# Patient Record
Sex: FEMALE | Race: WHITE | NOT HISPANIC OR LATINO | Employment: FULL TIME | URBAN - METROPOLITAN AREA
[De-identification: names, ages, dates, MRNs, and addresses within clinical notes are randomized per-mention and may not be internally consistent; named-entity substitution may affect disease eponyms.]

---

## 2023-12-06 ENCOUNTER — OFFICE VISIT (OUTPATIENT)
Dept: OBSTETRICS AND GYNECOLOGY | Facility: CLINIC | Age: 38
End: 2023-12-06
Payer: COMMERCIAL

## 2023-12-06 VITALS
SYSTOLIC BLOOD PRESSURE: 110 MMHG | WEIGHT: 128.4 LBS | HEIGHT: 67 IN | DIASTOLIC BLOOD PRESSURE: 70 MMHG | BODY MASS INDEX: 20.15 KG/M2

## 2023-12-06 DIAGNOSIS — Z87.42 HISTORY OF ABNORMAL CERVICAL PAP SMEAR: ICD-10-CM

## 2023-12-06 DIAGNOSIS — Z01.419 WOMEN'S ANNUAL ROUTINE GYNECOLOGICAL EXAMINATION: Primary | ICD-10-CM

## 2023-12-06 DIAGNOSIS — Z30.011 ENCOUNTER FOR INITIAL PRESCRIPTION OF CONTRACEPTIVE PILLS: ICD-10-CM

## 2023-12-06 PROCEDURE — 99396 PREV VISIT EST AGE 40-64: CPT | Performed by: OBSTETRICS & GYNECOLOGY

## 2023-12-06 RX ORDER — NORGESTIMATE AND ETHINYL ESTRADIOL 7DAYSX3 28
1 KIT ORAL DAILY
Qty: 84 TABLET | Refills: 3 | Status: SHIPPED | OUTPATIENT
Start: 2023-12-06

## 2023-12-06 NOTE — PROGRESS NOTES
Gynecologic Annual Exam Note          GYN Annual Exam     Annual Exam        Subjective     HPI  Olena Larios is a 38 y.o. female, , who presents for annual well woman exam as a established patient.   Her periods are regular x 4 days of moderate flow.  She denies dysmenorrhea.  Partner Status: Marital Status: . She is is sexually active. She has not had new partners.  STD testing recommendations have been explained to the patient and she does not desire STD testing. There were no changes to her medical or surgical history since her last visit.     She is nursing at night.  She was prescribed TERRI at her PP visit but she did not start because she was concerned it would decrease milk supply. She would like to start OCP now.       Additional OB/GYN History   Current contraception: contraceptive methods: natural family planning   Desires to: desires oral contraception    Last Pap : . Result: negative. HPV: negative.   Last Completed Pap Smear            PAP SMEAR (Every 3 Years) Next due on 2025  LIQUID-BASED PAP SMEAR, [facility] ([facility])    2021  SCANNED - PAP SMEAR    2020  Done - hpv regardless; negative                  History of abnormal Pap smear: yes - mild dysplasia in 2018  Family history of uterine, colon, breast, or ovarian cancer: yes - pt's mother had breast cancer  dx @ age 56  Performs monthly Self-Breast Exam: yes  Last mammogram: 2013      Last Completed Mammogram       This patient has no relevant Health Maintenance data.              Colonoscopy: has never had a colonoscopy.  Exercises Regularly: no  Feelings of Anxiety or Depression: no  Tobacco Usage?: No       Current Outpatient Medications:     norgestimate-ethinyl estradiol (Ortho Tri-Cyclen, 28,) 0.18/0.215/0.25 MG-35 MCG per tablet, Take 1 tablet by mouth Daily., Disp: 84 tablet, Rfl: 3    Prenatal Vit-Fe Fumarate-FA (PRENATAL PO), Take  by mouth., Disp: , Rfl:           OB History          1    Para   1    Term   1       0    AB   0    Living   1         SAB   0    IAB   0    Ectopic   0    Molar   0    Multiple   0    Live Births   1                Past Medical History:   Diagnosis Date    Abnormal Pap smear of cervix 2018    Mild dysplasia    Anemia 2006    Borderline    Cervical dysplasia 2018    Mild    Fibroadenoma     Fibrocystic breast     Herpes genitalia     History of abnormal cervical Pap smear 2018    HPV +    HPV (human papilloma virus) infection 2018    Colposcopy    Ovarian cyst ?    Laparoscopy    Varicella         Past Surgical History:   Procedure Laterality Date    BREAST BIOPSY  2006    Multiple--all benign    BREAST FIBROADENOMA SURGERY Left     COLPOSCOPY  2018    OVARIAN CYST REMOVAL      Patient unable to recall    OVARIAN CYST SURGERY      Laparoscopy    SKIN BIOPSY      Left leg, sternum, back       Health Maintenance   Topic Date Due    ANNUAL PHYSICAL  Never done    Annual Gynecologic Pelvic and Breast Exam  2022    INFLUENZA VACCINE  2023    COVID-19 Vaccine (2023- season) 2023    PAP SMEAR  2025    TDAP/TD VACCINES (3 - Td or Tdap) 2032    HEPATITIS C SCREENING  Completed    Pneumococcal Vaccine 0-64  Aged Out       The additional following portions of the patient's history were reviewed and updated as appropriate: allergies, current medications, past family history, past medical history, past social history, past surgical history, and problem list.    Review of Systems   Constitutional: Negative.    HENT: Negative.     Eyes: Negative.    Respiratory: Negative.     Cardiovascular: Negative.    Gastrointestinal: Negative.    Endocrine: Negative.    Genitourinary: Negative.    Musculoskeletal: Negative.    Skin: Negative.    Allergic/Immunologic: Negative.    Neurological: Negative.    Hematological: Negative.    Psychiatric/Behavioral: Negative.           I have reviewed and  "agree with the HPI, ROS, and historical information as entered above. Adriana Muñiz MD          Objective   /70   Ht 170.2 cm (67\")   Wt 58.2 kg (128 lb 6.4 oz)   LMP 11/22/2023 (Approximate)   Breastfeeding Yes   BMI 20.11 kg/m²     Physical Exam  Vitals and nursing note reviewed. Exam conducted with a chaperone present.   Constitutional:       Appearance: She is well-developed.   HENT:      Head: Normocephalic and atraumatic.   Eyes:      Pupils: Pupils are equal, round, and reactive to light.   Neck:      Thyroid: No thyroid mass or thyromegaly.   Pulmonary:      Effort: Pulmonary effort is normal. No retractions.   Chest:      Chest wall: No mass.   Breasts:     Right: Normal. No mass, nipple discharge, skin change or tenderness.      Left: Normal. No mass, nipple discharge, skin change or tenderness.   Abdominal:      General: Bowel sounds are normal.      Palpations: Abdomen is soft. Abdomen is not rigid. There is no mass.      Tenderness: There is no abdominal tenderness. There is no guarding.      Hernia: No hernia is present. There is no hernia in the left inguinal area or right inguinal area.   Genitourinary:     Exam position: Lithotomy position.      Pubic Area: No rash.       Labia:         Right: No rash, tenderness or lesion.         Left: No rash, tenderness or lesion.       Urethra: No urethral pain or urethral swelling.      Vagina: Normal. No vaginal discharge or lesions.      Cervix: No cervical motion tenderness, discharge, lesion or cervical bleeding.      Uterus: Normal. Not enlarged, not fixed and not tender.       Adnexa:         Right: No mass, tenderness or fullness.          Left: No mass, tenderness or fullness.        Rectum: No external hemorrhoid.   Musculoskeletal:      Cervical back: Normal range of motion. No muscular tenderness.      Right lower leg: No edema.      Left lower leg: No edema.   Skin:     General: Skin is warm and dry.   Neurological:      Mental Status: " She is alert and oriented to person, place, and time.      Motor: Motor function is intact.   Psychiatric:         Mood and Affect: Mood and affect normal.         Behavior: Behavior normal.            Assessment and Plan    Problem List Items Addressed This Visit    None  Visit Diagnoses       Women's annual routine gynecological examination    -  Primary    Relevant Orders    Mammo Screening Digital Tomosynthesis Bilateral With CAD    Encounter for initial prescription of contraceptive pills                GYN annual well woman exam.   Pap guidelines reviewed.  OCP's/Vaginal Ring - Discussed side effects of nausea, BTB, headaches, breast tenderness and slight weight gain in the first three cycles.  Understands risks of blood clots, stroke, and theoretical risk of breast cancer.  Denies family history of blood clots.  Reviewed monthly self breast exams.  Instructed to call with lumps, pain, or breast discharge.    Ordered Mammogram today  Reviewed exercise as a preventative health measures.   Reccommended Flu Vaccine in Fall of each year.  RTC in 1 year or PRN with problems.  Return in about 1 year (around 12/6/2024) for Annual physical.     Adriana Muñiz MD  12/06/2023

## 2023-12-08 LAB — REF LAB TEST METHOD: NORMAL

## 2024-02-13 ENCOUNTER — DOCUMENTATION (OUTPATIENT)
Dept: OBSTETRICS AND GYNECOLOGY | Facility: CLINIC | Age: 39
End: 2024-02-13
Payer: COMMERCIAL

## 2024-03-13 ENCOUNTER — OFFICE VISIT (OUTPATIENT)
Dept: OBSTETRICS AND GYNECOLOGY | Facility: CLINIC | Age: 39
End: 2024-03-13
Payer: COMMERCIAL

## 2024-03-13 VITALS
DIASTOLIC BLOOD PRESSURE: 80 MMHG | WEIGHT: 128 LBS | HEIGHT: 67 IN | SYSTOLIC BLOOD PRESSURE: 110 MMHG | BODY MASS INDEX: 20.09 KG/M2

## 2024-03-13 DIAGNOSIS — O20.0 THREATENED ABORTION: Primary | ICD-10-CM

## 2024-03-13 RX ORDER — SACCHAROMYCES BOULARDII 250 MG
250 CAPSULE ORAL 2 TIMES DAILY
COMMUNITY

## 2024-03-13 RX ORDER — MULTIVIT WITH MINERALS/LUTEIN
250 TABLET ORAL DAILY
COMMUNITY

## 2024-03-13 NOTE — PROGRESS NOTES
"    Chief Complaint   Patient presents with    Threatened Miscarriage          HPI  Olena Larios is a 38 y.o. female, , who presents with U/S without fetal heart tones and U/S with gestational sac only.    Recent Tests:  She had a urine pregnancy test that was done 6 weeks ago that was positive..    US today: Yes.  Findings showed gestational sac measuring 6w1d. No ys or fp, +GEMA  I have personally evaluated the U/S and agree with the findings. Adriana Muñiz MD  She has not had prenatal care.  She denies associated abdominal or pelvic pain.. Her past medical history is non-contributory.  She does not have passage of tissue.  Rh Status: Positive  She reports no additional symptoms or complaints.  Patient states she has had some brown spotting Monday and today but denies pelvic pain or bright red bleeding  The additional following portions of the patient's history were reviewed and updated as appropriate: allergies and current medications.    Review of Systems  All other systems reviewed and are negative.     I have reviewed and agree with the HPI, ROS, and historical information as entered above. Adriana Muñiz MD      Objective   /80   Ht 170.2 cm (67\")   Wt 58.1 kg (128 lb)   BMI 20.05 kg/m²     Physical Exam  Vitals and nursing note reviewed.   Constitutional:       Appearance: Normal appearance.   HENT:      Head: Normocephalic and atraumatic.   Eyes:      General: No scleral icterus.     Pupils: Pupils are equal, round, and reactive to light.   Pulmonary:      Effort: Pulmonary effort is normal.      Breath sounds: Normal breath sounds.   Abdominal:      General: Bowel sounds are normal. There is no distension.      Palpations: Abdomen is soft.      Tenderness: There is no abdominal tenderness.   Musculoskeletal:         General: Normal range of motion.      Cervical back: Normal range of motion and neck supple.      Right lower leg: No edema.      Left lower leg: No edema.   Skin:   "   General: Skin is warm and dry.   Neurological:      General: No focal deficit present.      Mental Status: She is alert and oriented to person, place, and time.   Psychiatric:         Mood and Affect: Mood normal.         Behavior: Behavior normal. Behavior is cooperative.            Assessment and Plan    Problem List Items Addressed This Visit    None  Visit Diagnoses       Threatened     -  Primary    Relevant Orders    US Ob Transvaginal            Threatened AB GS measurement inconsistent with certain LMP. Concern for mab.   Pelvic Rest.  No douching, intercourse or use of tampons.  Call for an increase in bleeding, abdominal pain, or fever.  Return in about 1 week (around 3/20/2024) for Recheck, U/S Next Visit.    I spent 20 minutes caring for Olena on this date of service. This time includes time spent by me in the following activities:preparing for the visit, reviewing tests, obtaining and/or reviewing a separately obtained history, counseling and educating the patient/family/caregiver, and documenting information in the medical record        Adriana Muñiz MD  2024

## 2024-03-20 ENCOUNTER — OFFICE VISIT (OUTPATIENT)
Dept: OBSTETRICS AND GYNECOLOGY | Facility: CLINIC | Age: 39
End: 2024-03-20
Payer: COMMERCIAL

## 2024-03-20 VITALS
BODY MASS INDEX: 20.09 KG/M2 | DIASTOLIC BLOOD PRESSURE: 80 MMHG | WEIGHT: 128 LBS | HEIGHT: 67 IN | SYSTOLIC BLOOD PRESSURE: 122 MMHG

## 2024-03-20 DIAGNOSIS — O03.9 MISCARRIAGE: Primary | ICD-10-CM

## 2024-03-20 NOTE — PROGRESS NOTES
"    Chief Complaint   Patient presents with    Miscarriage          HPI  Olena Larios is a 38 y.o. female, , who presents with U/S without fetal heart tones.  She reports passing GS on saturday    Recent Tests:  She had a urine pregnancy test that was done 7 weeks ago that was positive..    US today: Yes.  Findings showed no evidence IUP.  I have personally evaluated the U/S and agree with the findings. Adriana Muñiz MD  She has not had prenatal care.  She denies associated abdominal or pelvic pain.. Her past medical history is non-contributory.  She has passage of tissue.  Rh Status: Positive  She reports no additional symptoms or complaints.    The additional following portions of the patient's history were reviewed and updated as appropriate: allergies and current medications.    Review of Systems  All other systems reviewed and are negative.     I have reviewed and agree with the HPI, ROS, and historical information as entered above. Adriana Muñiz MD      Objective   /80   Ht 170.2 cm (67\")   Wt 58.1 kg (128 lb)   Breastfeeding No   BMI 20.05 kg/m²     Physical Exam  Vitals and nursing note reviewed.   Constitutional:       Appearance: Normal appearance.   HENT:      Head: Normocephalic and atraumatic.   Eyes:      General: No scleral icterus.     Pupils: Pupils are equal, round, and reactive to light.   Pulmonary:      Effort: Pulmonary effort is normal.      Breath sounds: Normal breath sounds.   Abdominal:      General: Bowel sounds are normal. There is no distension.      Palpations: Abdomen is soft.      Tenderness: There is no abdominal tenderness.   Musculoskeletal:         General: Normal range of motion.      Cervical back: Normal range of motion and neck supple.      Right lower leg: No edema.      Left lower leg: No edema.   Skin:     General: Skin is warm and dry.   Neurological:      General: No focal deficit present.      Mental Status: She is alert and oriented to " person, place, and time.   Psychiatric:         Mood and Affect: Mood normal.         Behavior: Behavior normal. Behavior is cooperative.          Assessment and Plan    Problem List Items Addressed This Visit    None  Visit Diagnoses       Miscarriage    -  Primary    completed            Now completed Sab  She has rx for ocps at home. They dont think they will TTC again at this point.   No follow-ups on file.    I spent 30 minutes caring for Olena on this date of service. This time includes time spent by me in the following activities:preparing for the visit, reviewing tests, obtaining and/or reviewing a separately obtained history, ordering medications, tests, or procedures, and documenting information in the medical record        Adriana Muñiz MD  03/20/2024

## 2024-03-22 ENCOUNTER — TELEPHONE (OUTPATIENT)
Dept: OBSTETRICS AND GYNECOLOGY | Facility: CLINIC | Age: 39
End: 2024-03-22
Payer: COMMERCIAL

## 2024-03-22 NOTE — TELEPHONE ENCOUNTER
Patient still breastfeeding at night but is due for mammogram. She has a family history of breast cancer and does not want to postpone.     Per Antonino: Pt should pump immediately before mammogram. No need to pump and dump after.     Pt notified and VU.

## 2024-03-26 ENCOUNTER — APPOINTMENT (OUTPATIENT)
Dept: OTHER | Facility: HOSPITAL | Age: 39
End: 2024-03-26
Payer: COMMERCIAL

## 2024-03-26 ENCOUNTER — HOSPITAL ENCOUNTER (OUTPATIENT)
Dept: MAMMOGRAPHY | Facility: HOSPITAL | Age: 39
Discharge: HOME | End: 2024-03-26
Payer: COMMERCIAL

## 2024-03-26 DIAGNOSIS — Z01.419 WOMEN'S ANNUAL ROUTINE GYNECOLOGICAL EXAMINATION: ICD-10-CM

## 2024-03-26 PROCEDURE — 77067 SCR MAMMO BI INCL CAD: CPT

## 2024-03-26 PROCEDURE — 77063 BREAST TOMOSYNTHESIS BI: CPT

## 2024-09-23 ENCOUNTER — HOSPITAL ENCOUNTER (OUTPATIENT)
Dept: MRI IMAGING | Facility: HOSPITAL | Age: 39
Discharge: HOME | End: 2024-09-23
Admitting: INTERNAL MEDICINE
Payer: COMMERCIAL

## 2024-09-23 DIAGNOSIS — Z91.89 AT HIGH RISK FOR BREAST CANCER: ICD-10-CM

## 2024-09-23 PROCEDURE — A9577 INJ MULTIHANCE: HCPCS | Performed by: INTERNAL MEDICINE

## 2024-09-23 PROCEDURE — 0 GADOBENATE DIMEGLUMINE 529 MG/ML SOLUTION: Performed by: INTERNAL MEDICINE

## 2024-09-23 PROCEDURE — 77049 MRI BREAST C-+ W/CAD BI: CPT

## 2024-09-23 RX ADMIN — GADOBENATE DIMEGLUMINE 12 ML: 529 INJECTION, SOLUTION INTRAVENOUS at 09:07

## 2024-09-30 ENCOUNTER — TELEPHONE (OUTPATIENT)
Dept: MRI IMAGING | Facility: HOSPITAL | Age: 39
End: 2024-09-30
Payer: COMMERCIAL

## 2024-09-30 NOTE — TELEPHONE ENCOUNTER
Patient was recommended from her MRI Breast for a Right 2nd look ultrasound. Scheduled for 10/7/2024 at 9:00 with 8:45 arrival at [address]. No BT. Encouraged to call with any further questions.   
mild

## 2024-10-07 ENCOUNTER — HOSPITAL ENCOUNTER (OUTPATIENT)
Dept: ULTRASOUND IMAGING | Facility: HOSPITAL | Age: 39
Discharge: HOME | End: 2024-10-07
Payer: COMMERCIAL

## 2024-10-07 ENCOUNTER — HOSPITAL ENCOUNTER (OUTPATIENT)
Dept: MAMMOGRAPHY | Facility: HOSPITAL | Age: 39
Discharge: HOME | End: 2024-10-07
Payer: COMMERCIAL

## 2024-10-07 DIAGNOSIS — Z91.89 AT HIGH RISK FOR BREAST CANCER: ICD-10-CM

## 2024-10-07 DIAGNOSIS — R92.8 ABNORMAL MRI, BREAST: ICD-10-CM

## 2024-10-07 PROCEDURE — 25010000002 LIDOCAINE 1% - EPINEPHRINE 1:100000 1 %-1:100000 SOLUTION: Performed by: RADIOLOGY

## 2024-10-07 PROCEDURE — 76642 ULTRASOUND BREAST LIMITED: CPT | Performed by: RADIOLOGY

## 2024-10-07 PROCEDURE — 25010000002 LIDOCAINE 1 % SOLUTION: Performed by: RADIOLOGY

## 2024-10-07 PROCEDURE — 76642 ULTRASOUND BREAST LIMITED: CPT

## 2024-10-07 PROCEDURE — 88305 TISSUE EXAM BY PATHOLOGIST: CPT | Performed by: RADIOLOGY

## 2024-10-07 PROCEDURE — A4648 IMPLANTABLE TISSUE MARKER: HCPCS

## 2024-10-07 RX ORDER — LIDOCAINE HYDROCHLORIDE AND EPINEPHRINE 10; 10 MG/ML; UG/ML
10 INJECTION, SOLUTION INFILTRATION; PERINEURAL ONCE
Status: COMPLETED | OUTPATIENT
Start: 2024-10-07 | End: 2024-10-07

## 2024-10-07 RX ORDER — LIDOCAINE HYDROCHLORIDE 10 MG/ML
5 INJECTION, SOLUTION INFILTRATION; PERINEURAL ONCE
Status: COMPLETED | OUTPATIENT
Start: 2024-10-07 | End: 2024-10-07

## 2024-10-07 RX ADMIN — Medication 2 ML: at 12:10

## 2024-10-07 RX ADMIN — LIDOCAINE HYDROCHLORIDE,EPINEPHRINE BITARTRATE 7 ML: 10; .01 INJECTION, SOLUTION INFILTRATION; PERINEURAL at 12:10

## 2024-10-07 RX ADMIN — LIDOCAINE HYDROCHLORIDE,EPINEPHRINE BITARTRATE 7 ML: 10; .01 INJECTION, SOLUTION INFILTRATION; PERINEURAL at 12:14

## 2024-10-07 RX ADMIN — Medication 3 ML: at 12:14

## 2024-10-07 NOTE — PROGRESS NOTES
Alert and oriented. Denies discomfort, no active bleeding, steri-strips intact, gauze dressing applied both sites.  Cold packs given.  Verbalizes and demonstrates understanding of post-care instructions, written copy given. Questions answered, support given.

## 2024-10-08 ENCOUNTER — TELEPHONE (OUTPATIENT)
Dept: MAMMOGRAPHY | Facility: HOSPITAL | Age: 39
End: 2024-10-08
Payer: COMMERCIAL

## 2024-10-08 LAB
CYTO UR: NORMAL
CYTO UR: NORMAL
LAB AP CASE REPORT: NORMAL
LAB AP CASE REPORT: NORMAL
LAB AP CLINICAL INFORMATION: NORMAL
LAB AP CLINICAL INFORMATION: NORMAL
LAB AP DIAGNOSIS COMMENT: NORMAL
LAB AP DIAGNOSIS COMMENT: NORMAL
PATH REPORT.FINAL DX SPEC: NORMAL
PATH REPORT.FINAL DX SPEC: NORMAL
PATH REPORT.GROSS SPEC: NORMAL
PATH REPORT.GROSS SPEC: NORMAL

## 2024-10-08 NOTE — TELEPHONE ENCOUNTER
Patient notified of pathology results and recommendation. Verbalizes understanding. Denies discomfort. Denies signs and symptoms of infection. Questions answered, support given, verbalized understanding. She stated Nay Newton called her to schedule MRI biopsy on 10/17/24. Patient transferred to [facility] scheduling per request to schedule recommended follow-up.

## 2024-10-10 ENCOUNTER — TELEPHONE (OUTPATIENT)
Dept: MRI IMAGING | Facility: HOSPITAL | Age: 39
End: 2024-10-10
Payer: COMMERCIAL

## 2024-10-10 NOTE — TELEPHONE ENCOUNTER
Patient was recommended from her MRI Breast for a Right sided MRI Breast Biopsy. Scheduled for 10/17/2024 at 1:00 with 12:45 arrival at [facility]. No BT. Procedure described in detail and encouraged to call with any further questions.

## 2024-10-11 ENCOUNTER — TRANSCRIBE ORDERS (OUTPATIENT)
Dept: ADMINISTRATIVE | Facility: HOSPITAL | Age: 39
End: 2024-10-11
Payer: COMMERCIAL

## 2024-10-11 DIAGNOSIS — R92.8 ABNORMAL MAMMOGRAM: Primary | ICD-10-CM

## 2024-10-17 ENCOUNTER — HOSPITAL ENCOUNTER (OUTPATIENT)
Dept: MRI IMAGING | Facility: HOSPITAL | Age: 39
Discharge: HOME | End: 2024-10-17
Payer: COMMERCIAL

## 2024-10-17 ENCOUNTER — HOSPITAL ENCOUNTER (OUTPATIENT)
Dept: MAMMOGRAPHY | Facility: HOSPITAL | Age: 39
Discharge: HOME | End: 2024-10-17
Payer: COMMERCIAL

## 2024-10-17 DIAGNOSIS — R92.8 ABNORMAL FINDINGS ON DIAGNOSTIC IMAGING OF BREAST: ICD-10-CM

## 2024-10-17 PROCEDURE — 25010000002 LIDOCAINE 1 % SOLUTION: Performed by: RADIOLOGY

## 2024-10-17 PROCEDURE — A4648 IMPLANTABLE TISSUE MARKER: HCPCS

## 2024-10-17 PROCEDURE — A9577 INJ MULTIHANCE: HCPCS | Performed by: INTERNAL MEDICINE

## 2024-10-17 PROCEDURE — 0 GADOBENATE DIMEGLUMINE 529 MG/ML SOLUTION: Performed by: INTERNAL MEDICINE

## 2024-10-17 PROCEDURE — 25010000002 LIDOCAINE 1% - EPINEPHRINE 1:100000 1 %-1:100000 SOLUTION: Performed by: RADIOLOGY

## 2024-10-17 RX ORDER — LIDOCAINE HYDROCHLORIDE 10 MG/ML
4 INJECTION, SOLUTION INFILTRATION; PERINEURAL
Status: COMPLETED | OUTPATIENT
Start: 2024-10-17 | End: 2024-10-17

## 2024-10-17 RX ORDER — LIDOCAINE HYDROCHLORIDE AND EPINEPHRINE 10; 10 MG/ML; UG/ML
14 INJECTION, SOLUTION INFILTRATION; PERINEURAL
Status: COMPLETED | OUTPATIENT
Start: 2024-10-17 | End: 2024-10-17

## 2024-10-17 RX ADMIN — LIDOCAINE HYDROCHLORIDE AND EPINEPHRINE 14 ML: 10; 10 INJECTION, SOLUTION INFILTRATION; PERINEURAL at 14:36

## 2024-10-17 RX ADMIN — Medication 4 ML: at 14:36

## 2024-10-17 RX ADMIN — GADOBENATE DIMEGLUMINE 12 ML: 529 INJECTION, SOLUTION INTRAVENOUS at 13:55

## 2024-10-21 ENCOUNTER — TELEPHONE (OUTPATIENT)
Dept: MAMMOGRAPHY | Facility: HOSPITAL | Age: 39
End: 2024-10-21
Payer: COMMERCIAL

## 2024-10-21 LAB
CYTO UR: NORMAL
LAB AP CASE REPORT: NORMAL
LAB AP CLINICAL INFORMATION: NORMAL
LAB AP DIAGNOSIS COMMENT: NORMAL
PATH REPORT.FINAL DX SPEC: NORMAL
PATH REPORT.GROSS SPEC: NORMAL

## 2024-10-21 NOTE — TELEPHONE ENCOUNTER
Patient notified of pathology results and recommendation. Verbalizes understanding. Denies discomfort. Denies signs and symptoms of infection. Questions answered, support given, verbalized understanding. She has already been scheduled for her follow-up appointment.

## 2025-01-30 RX ORDER — NORGESTIMATE AND ETHINYL ESTRADIOL 7DAYSX3 28
1 KIT ORAL DAILY
Qty: 84 TABLET | Refills: 3 | Status: SHIPPED | OUTPATIENT
Start: 2025-01-30

## 2025-02-19 ENCOUNTER — OFFICE VISIT (OUTPATIENT)
Dept: OBSTETRICS AND GYNECOLOGY | Facility: CLINIC | Age: 40
End: 2025-02-19
Payer: COMMERCIAL

## 2025-02-19 VITALS
WEIGHT: 137 LBS | SYSTOLIC BLOOD PRESSURE: 108 MMHG | DIASTOLIC BLOOD PRESSURE: 78 MMHG | HEIGHT: 67 IN | BODY MASS INDEX: 21.5 KG/M2

## 2025-02-19 DIAGNOSIS — Z80.3 FAMILY HISTORY OF BREAST CANCER: ICD-10-CM

## 2025-02-19 DIAGNOSIS — Z30.41 ENCOUNTER FOR SURVEILLANCE OF CONTRACEPTIVE PILLS: ICD-10-CM

## 2025-02-19 DIAGNOSIS — Z01.419 WOMEN'S ANNUAL ROUTINE GYNECOLOGICAL EXAMINATION: ICD-10-CM

## 2025-02-19 DIAGNOSIS — N89.8 VAGINAL DISCHARGE: ICD-10-CM

## 2025-02-19 DIAGNOSIS — Z01.419 PAP TEST, AS PART OF ROUTINE GYNECOLOGICAL EXAMINATION: Primary | ICD-10-CM

## 2025-02-19 RX ORDER — NORGESTIMATE AND ETHINYL ESTRADIOL 7DAYSX3 28
1 KIT ORAL DAILY
Qty: 84 TABLET | Refills: 3 | Status: SHIPPED | OUTPATIENT
Start: 2025-02-19

## 2025-02-19 NOTE — PROGRESS NOTES
Gynecologic Annual Exam Note          GYN Annual Exam     Gynecologic Exam        Subjective     HPI  Olena Larios is a 39 y.o. female, , who presents for annual well woman exam as a established patient. There were no changes to her medical or surgical history since her last visit..  2025  Her periods occur every 28-30, lasting 3-4 days.  The flow is moderate. She denies dysmenorrhea. Marital Status: . She is sexually active. She has not had new partners.. STD testing recommendations have been explained to the patient and she declines STD testing.    The patient would like to discuss the following complaints today: thinks she may have BV or yeast having some discharge and itchy    Additional OB/GYN History   contraceptive methods: OCP (estrogen/progesterone)  Desires to: continue contraception  History of migraines: no    Last Pap : 2023. Result: negative. HPV:  na .   Last Completed Pap Smear            Ordered - PAP SMEAR (Every 3 Years) Ordered on 2023  LIQUID-BASED PAP SMEAR WITH HPV GENOTYPING IF ASCUS ([facility])    2022  LIQUID-BASED PAP SMEAR, [facility] ([facility])    2021  SCANNED - PAP SMEAR    2020  Done - hpv regardless; negative                  History of abnormal Pap smear: yes - mild dysplasia  Family history of uterine, colon, breast, or ovarian cancer: yes - mother breast cancer  Performs monthly Self-Breast Exam: yes  Last mammogram: 2024. Done at [facility]. There is a copy in the chart.    Last Completed Mammogram            Scheduled - MAMMOGRAM (Every 2 Years) Scheduled for 2024  Mammo Screening Digital Tomosynthesis Bilateral With CAD    2013  MAMMO DIAGNOSTIC BILATERAL W CAD                    Colonoscopy: has never had a colonoscopy or cologuard  Exercises Regularly: yes  Feelings of Anxiety or Depression: no  Tobacco Usage?: No       Current Outpatient Medications:      norgestimate-ethinyl estradiol (Tri-Sprintec) 0.18/0.215/0.25 MG-35 MCG per tablet, Take 1 tablet by mouth Daily., Disp: 84 tablet, Rfl: 3    saccharomyces boulardii (FLORASTOR) 250 MG capsule, Take 1 capsule by mouth 2 (Two) Times a Day., Disp: , Rfl:     vitamin C (ASCORBIC ACID) 250 MG tablet, Take 1 tablet by mouth Daily., Disp: , Rfl:      Patient is requesting refills of OCP's.    OB History          1    Para   1    Term   1       0    AB   0    Living   1         SAB   0    IAB   0    Ectopic   0    Molar   0    Multiple   0    Live Births   1                Past Medical History:   Diagnosis Date    Abnormal Pap smear of cervix 2018    Mild dysplasia    Anemia 2006    Borderline    Cervical dysplasia 2018    Mild    Fibroadenoma     Fibrocystic breast     Herpes genitalia     History of abnormal cervical Pap smear 2018    HPV +    HPV (human papilloma virus) infection 2018    Colposcopy    Ovarian cyst ?    Laparoscopy    Varicella         Past Surgical History:   Procedure Laterality Date    BREAST BIOPSY  2006    Multiple--all benign    BREAST FIBROADENOMA SURGERY Left     COLPOSCOPY  2018    OVARIAN CYST REMOVAL      Patient unable to recall    OVARIAN CYST SURGERY      Laparoscopy    SKIN BIOPSY      Left leg, sternum, back       Health Maintenance   Topic Date Due    ANNUAL PHYSICAL  Never done    Annual Gynecologic Pelvic and Breast Exam  2024    MAMMOGRAM  2026    PAP SMEAR  2026    TDAP/TD VACCINES (3 - Td or Tdap) 2032    HEPATITIS C SCREENING  Completed    COVID-19 Vaccine  Completed    INFLUENZA VACCINE  Completed    Pneumococcal Vaccine 0-49  Aged Out       The additional following portions of the patient's history were reviewed and updated as appropriate: allergies, current medications, past family history, past medical history, past social history, past surgical history, and problem list.    Review of Systems      I have reviewed and  "agree with the HPI, ROS, and historical information as entered above. Adriana Muñiz MD          Objective   /78   Ht 170.2 cm (67\")   Wt 62.1 kg (137 lb)   LMP 01/25/2025 (Approximate)   BMI 21.46 kg/m²     Physical Exam  Vitals and nursing note reviewed. Exam conducted with a chaperone present.   Constitutional:       Appearance: She is well-developed.   HENT:      Head: Normocephalic and atraumatic.   Eyes:      Pupils: Pupils are equal, round, and reactive to light.   Neck:      Thyroid: No thyroid mass or thyromegaly.   Pulmonary:      Effort: Pulmonary effort is normal. No retractions.   Chest:      Chest wall: No mass.   Breasts:     Right: Normal. No mass, nipple discharge, skin change or tenderness.      Left: Normal. No mass, nipple discharge, skin change or tenderness.   Abdominal:      General: Bowel sounds are normal.      Palpations: Abdomen is soft. Abdomen is not rigid. There is no mass.      Tenderness: There is no abdominal tenderness. There is no guarding.      Hernia: No hernia is present. There is no hernia in the left inguinal area or right inguinal area.   Genitourinary:     Exam position: Lithotomy position.      Pubic Area: No rash.       Labia:         Right: No rash, tenderness or lesion.         Left: No rash, tenderness or lesion.       Urethra: No urethral pain or urethral swelling.      Vagina: Normal. No vaginal discharge or lesions.      Cervix: No cervical motion tenderness, discharge, lesion or cervical bleeding.      Uterus: Normal. Not enlarged, not fixed and not tender.       Adnexa:         Right: No mass, tenderness or fullness.          Left: No mass, tenderness or fullness.        Rectum: No external hemorrhoid.   Musculoskeletal:      Cervical back: Normal range of motion. No muscular tenderness.      Right lower leg: No edema.      Left lower leg: No edema.   Skin:     General: Skin is warm and dry.   Neurological:      Mental Status: She is alert and oriented to " person, place, and time.      Motor: Motor function is intact.   Psychiatric:         Mood and Affect: Mood and affect normal.         Behavior: Behavior normal.            Assessment and Plan    Problem List Items Addressed This Visit    None  Visit Diagnoses       Pap test, as part of routine gynecological examination    -  Primary    Relevant Orders    LIQUID-BASED PAP SMEAR WITH HPV GENOTYPING IF ASCUS ([facility])    Women's annual routine gynecological examination        Relevant Orders    Ambulatory Referral to Genetic Counseling/Testing    Encounter for surveillance of contraceptive pills        Vaginal discharge        Relevant Orders    NuSwab BV & Candida - Swab, Vagina    Family history of breast cancer                GYN annual well woman exam.   Referral to breast ca genetics  Pap guidelines reviewed.  OCP's/Vaginal Ring - Discussed side effects of nausea, BTB, headaches, breast tenderness and slight weight gain in the first three cycles.  Understands risks of blood clots, stroke, and theoretical risk of breast cancer.  Denies family history of blood clots.  Reviewed risks/benefits of hormonal contraception after age 35, including possible increased risk of breast cancer, heart disease, blood clots and strokes.  Patient strongly desires to stay on hormonal contraception.  Reviewed monthly self breast exams.  Instructed to call with lumps, pain, or breast discharge.    Reviewed exercise as a preventative health measures.   Return in about 1 year (around 2/19/2026) for Annual physical.     Adriana Muñiz MD  02/19/2025

## 2025-02-21 LAB
A VAGINAE DNA VAG QL NAA+PROBE: ABNORMAL SCORE
BVAB2 DNA VAG QL NAA+PROBE: ABNORMAL SCORE
C ALBICANS DNA VAG QL NAA+PROBE: POSITIVE
C GLABRATA DNA VAG QL NAA+PROBE: NEGATIVE
MEGA1 DNA VAG QL NAA+PROBE: ABNORMAL SCORE

## 2025-02-24 ENCOUNTER — TELEPHONE (OUTPATIENT)
Dept: OBSTETRICS AND GYNECOLOGY | Facility: CLINIC | Age: 40
End: 2025-02-24
Payer: COMMERCIAL

## 2025-02-24 RX ORDER — FLUCONAZOLE 150 MG/1
150 TABLET ORAL AS NEEDED
Qty: 2 TABLET | Refills: 0 | Status: SHIPPED | OUTPATIENT
Start: 2025-02-24

## 2025-02-24 NOTE — TELEPHONE ENCOUNTER
Patient saw Dr Muñiz last week for her annual and called because she saw her culture was positive for yeast. She wanted to see if we could send in something for her. Advised that I would reach out to Dr Muñiz or have Jenny the NP send in something for her. Would you mind to send the Diflucan for her? She uses the [facility] on [facility]. Spoke with Jenny, she will send Rx in.       -RAJNI Mckeon

## 2025-03-19 ENCOUNTER — OFFICE VISIT (OUTPATIENT)
Dept: INTERNAL MEDICINE | Facility: CLINIC | Age: 40
End: 2025-03-19
Payer: COMMERCIAL

## 2025-03-19 VITALS
SYSTOLIC BLOOD PRESSURE: 108 MMHG | OXYGEN SATURATION: 100 % | HEART RATE: 84 BPM | DIASTOLIC BLOOD PRESSURE: 64 MMHG | HEIGHT: 67 IN | TEMPERATURE: 98.5 F | BODY MASS INDEX: 20.91 KG/M2 | WEIGHT: 133.2 LBS

## 2025-03-19 DIAGNOSIS — Z80.3 FAMILY HISTORY OF BREAST CANCER IN FIRST DEGREE RELATIVE: ICD-10-CM

## 2025-03-19 DIAGNOSIS — Z76.89 ENCOUNTER TO ESTABLISH CARE: Primary | ICD-10-CM

## 2025-03-19 DIAGNOSIS — R19.5 LOOSE STOOLS: ICD-10-CM

## 2025-03-19 DIAGNOSIS — Z12.11 SCREEN FOR COLON CANCER: ICD-10-CM

## 2025-03-19 DIAGNOSIS — R19.4 CHANGE IN BOWEL HABITS: ICD-10-CM

## 2025-03-19 DIAGNOSIS — R11.2 NAUSEA AND VOMITING, UNSPECIFIED VOMITING TYPE: ICD-10-CM

## 2025-03-19 DIAGNOSIS — Z98.890 HISTORY OF BREAST BIOPSY: ICD-10-CM

## 2025-03-19 RX ORDER — MULTIPLE VITAMINS W/ MINERALS TAB 9MG-400MCG
1 TAB ORAL DAILY
COMMUNITY

## 2025-03-19 NOTE — PROGRESS NOTES
Office Note     Name: Olena Larios    : 1985     MRN: [id number]     Chief Complaint  Establish Care (Patient reports today to establish care. Patient states that she is wanting to discuss her GI issues. Patient states around 8 weeks ago she started experiencing gi problems after having a virus. Patient states she is concerned because it has been 2 months and her bowls are still not back to normal. Patient states that on an average day her bowls are more frequent and the consistency has started to even back out from the loose stools. ) and GI Problem    Subjective     History of Present Illness:  Olena Larios is a 39 y.o. female who presents today to establish care with a new provider.  [facility]  Her last annual physical was in May 2024.  Patient reports she is establishing with a new provider due to a difficult time trying to coordinate with [facility] and ongoing mammography testing.  Past medical medication history reviewed with the patient.    Patient reports her mother had breast cancer and passed away at the age of 56.  Patient reports she was a teenager at that time.  She thinks she was diagnosed with stage IV breast cancer.      Patient personally found a lump in her breast when she was in her 20s.  She was told it was a fibroadenoma.  She has had a personal history of several breast biopsies.  She does follow with a high  and gynecology regularly.  She has considered doing genetic testing but this has been placed on hold currently.  This was placed by gynecology.  She does plan to transfer her care to [place].    Patient will be due for a mammogram in March.  Last mammogram was 2024.  She has her mammogram scheduled for 2025.  She normally does the mammogram in the spring and a breast MRI in the fall.  She has had 2 ultrasounds and 1 MRI guided biopsy by Dr. Mcpherson of the right breast in the past.  She  is now doing diagnostic mammograms.  She does follow with Dr. Muñiz.      She experienced some anemia during her pregnancy.  She had her first pregnancy at the age of 36.  They did not do any fecal testing at that time.  She was on thyroid medication during her pregnancy.  She reports the anemia and thyroid issues both resolved after her delivery.  Patient did experience a miscarriage last year.    Additional family history consist of her mother who was thought to have skin cancer as well.  A maternal cousin with skin cancer at the age of 37.  Patient's father passed away from bladder cancer and skin cancer at 76 years old.  Patient reports her father passed within 1 month of diagnosis.  She does have a living uncle with prostate cancer.  Her paternal grandfather had pancreatic cancer.  Maternal grandfather with leukemia.  Maternal grandmother lived to be 100 but did have several melanoma skin cancers.    Patient has had a history of ovarian cyst in the past.  She had 1 laparoscopic removal about 15 years ago.  She is also underwent an echo to rule out a heart murmur.  She reports the echo came back normal.    She does she do yearly Pap smears and had her most recent one done in February.    She is  with a 2-year-old son.  She is a  in the family court system.    She recently thinks she may have had norovirus back in January.  She was experiencing nausea, vomiting, and diarrhea.  She reports her bowel movements were straight liquid for days.  She reports she was very sick for about 3 days.  She has had some ongoing stomach issues.  She has been experiencing some abdominal discomfort and bloating.  She is also continue to experience intermittent diarrhea.  She reports the abdominal pain as a burning sensation to the lower part of her abdomen with cramping.  She had similar symptoms when she was in high school and was experiencing a food intolerance.  She reports symptoms have improved over the  past 1 week.    She does take Pepto as needed.  She does note that her stools turned black after using Pepto.  She has noted a change in bowel habits.  She is now having daily bowel movements when she was previously not.  She reports some days she does have more than 1 stool.  She reports more loose stools and constipation over the past 2 months.    No further complaints or concerns at this time.  Patient presents today to establish care with a new provider.      Past Medical History:   Diagnosis Date    Abnormal Pap smear of cervix 5/2018    Mild dysplasia    Anemia 2006    Borderline    Cervical dysplasia 5/2018    Mild    Fibroadenoma     Fibrocystic breast     Herpes genitalia     History of abnormal cervical Pap smear 05/2018    HPV +    HPV (human papilloma virus) infection 5/2018    Colposcopy    Ovarian cyst 2012?    Laparoscopy    Varicella 1990       Past Surgical History:   Procedure Laterality Date    BREAST BIOPSY  2006    Multiple--all benign    BREAST FIBROADENOMA SURGERY Left     COLPOSCOPY  2018    OVARIAN CYST REMOVAL      Patient unable to recall    OVARIAN CYST SURGERY  2012    Laparoscopy    SKIN BIOPSY      Left leg, sternum, back       Social History     Socioeconomic History    Marital status:      Spouse name: Niall   Tobacco Use    Smoking status: Never    Smokeless tobacco: Never   Vaping Use    Vaping status: Never Used   Substance and Sexual Activity    Alcohol use: Not Currently     Comment: 1-2 drinks social occasions/every few months    Drug use: Never    Sexual activity: Yes     Partners: Male     Birth control/protection: None         Current Outpatient Medications:     multivitamin with minerals tablet tablet, Take 1 tablet by mouth Daily., Disp: , Rfl:     norgestimate-ethinyl estradiol (Tri-Sprintec) 0.18/0.215/0.25 MG-35 MCG per tablet, Take 1 tablet by mouth Daily., Disp: 84 tablet, Rfl: 3    vitamin C (ASCORBIC ACID) 250 MG tablet, Take 1 tablet by mouth Daily.,  "Disp: , Rfl:     saccharomyces boulardii (FLORASTOR) 250 MG capsule, Take 1 capsule by mouth 2 (Two) Times a Day. (Patient not taking: Reported on 3/19/2025), Disp: , Rfl:     Objective     Vital Signs  /64   Pulse 84   Temp 98.5 °F (36.9 °C)   Ht 170.2 cm (67\")   Wt 60.4 kg (133 lb 3.2 oz)   SpO2 100%   BMI 20.86 kg/m²   Estimated body mass index is 20.86 kg/m² as calculated from the following:    Height as of this encounter: 170.2 cm (67\").    Weight as of this encounter: 60.4 kg (133 lb 3.2 oz).    BMI is within normal parameters. No other follow-up for BMI required.      Physical Exam  Constitutional:       General: She is not in acute distress.     Appearance: Normal appearance. She is not ill-appearing.   HENT:      Head: Normocephalic and atraumatic.      Nose: Nose normal.   Eyes:      Extraocular Movements: Extraocular movements intact.      Conjunctiva/sclera: Conjunctivae normal.      Pupils: Pupils are equal, round, and reactive to light.   Cardiovascular:      Rate and Rhythm: Normal rate and regular rhythm.      Heart sounds: Normal heart sounds.   Pulmonary:      Effort: Pulmonary effort is normal. No respiratory distress.      Breath sounds: Normal breath sounds.   Abdominal:      General: Bowel sounds are normal. There is no distension.      Palpations: Abdomen is soft. There is no mass.      Tenderness: There is no abdominal tenderness. There is no guarding or rebound.      Hernia: No hernia is present.   Musculoskeletal:         General: Normal range of motion.      Cervical back: Neck supple.   Skin:     General: Skin is warm and dry.   Neurological:      General: No focal deficit present.      Mental Status: She is alert and oriented to person, place, and time. Mental status is at baseline.   Psychiatric:         Mood and Affect: Mood normal.         Behavior: Behavior normal.         Thought Content: Thought content normal.         Judgment: Judgment normal.          Assessment and " Plan     Diagnoses and all orders for this visit:    1. Encounter to establish care (Primary)    2. Screen for colon cancer  -     Ambulatory Referral For Screening Colonoscopy    3. Family history of breast cancer in first degree relative    4. Nausea and vomiting, unspecified vomiting type    5. Loose stools    6. Change in bowel habits    7. History of breast biopsy        Follow Up  Return in about 2 months (around 5/19/2025) for Annual.    MICHAEL Patterson    Part of this note may be an electronic transcription/translation of spoken language to printed text using the Dragon Dictation System.

## 2025-03-27 ENCOUNTER — TELEPHONE (OUTPATIENT)
Dept: OBSTETRICS AND GYNECOLOGY | Facility: CLINIC | Age: 40
End: 2025-03-27
Payer: COMMERCIAL

## 2025-03-27 LAB
NCCN CRITERIA FLAG: ABNORMAL
TYRER CUZICK SCORE: 26.3

## 2025-03-27 NOTE — TELEPHONE ENCOUNTER
Patient calling to check the status of her pap smear from 2/19/25 visit.  No results in chart, reviewed with patient that I would call and follow up with [facility].  Patient voiced understanding.  Contacted [facility] lab- they stated that they had no record/not received a pap smear for this patient since 12/2023.  Returned patient call and reviewed with her.  Scheduled for repeat pap smear 4/3/25

## 2025-03-29 ENCOUNTER — RESULTS FOLLOW-UP (OUTPATIENT)
Facility: HOSPITAL | Age: 40
End: 2025-03-29
Payer: COMMERCIAL

## 2025-03-31 ENCOUNTER — DOCUMENTATION (OUTPATIENT)
Dept: GENETICS | Facility: HOSPITAL | Age: 40
End: 2025-03-31
Payer: COMMERCIAL

## 2025-03-31 PROBLEM — Z80.3 FAMILY HISTORY OF BREAST CANCER IN FIRST DEGREE RELATIVE: Status: ACTIVE | Noted: 2025-03-31

## 2025-03-31 NOTE — PROGRESS NOTES
Meets NCCN Criteria for Genetic Testing  Declines genetic testing?     Reason for decline?     If Reason for Decline is Previous Testing    [place] CARE     Non-CARE     Non-CARE Confirmation    Navigator Confirmed?     Patient Reported?     Elevated Tyrer-Cuzick Score ? Or Equal to 20%    Declines referral? Y   Reason for decline? Currently Following High Risk Care Plan

## 2025-03-31 NOTE — PROGRESS NOTES
This patient recently completed the CARE risk assessment for a mammogram appointment. Based on the patient's responses, the Tyrer-Cuzick breast cancer risk score is > 20.    Navigator follow-up:    I spoke with the patient about the recommendations and options for risk management for elevated Tyrer-Cuzick risk scores.  The patient has declined a referral for risk management at this time as she is currently following a high risk care plan, including breast MRI, with her provider.

## 2025-04-01 ENCOUNTER — TELEPHONE (OUTPATIENT)
Dept: INTERNAL MEDICINE | Facility: CLINIC | Age: 40
End: 2025-04-01
Payer: COMMERCIAL

## 2025-04-01 NOTE — TELEPHONE ENCOUNTER
PATIENT CALLED AND STATES THAT [facility] CALLED HER ABOUT REFERRAL  [id number] .    PATIENT STATES THAT [facility] NEEDS ADDITIONAL INFORMATION NEEDS TO BE SEND OVER SO THAT INSURANCE WILL COVER THE COLONOSCOPY.  PATIENT STATES THAT FOR INSURANCE TO COVER IT, SHE WILL HAVE TO WAIT UNTIL SHE IS 43 YEARS OLD.    PATIENT STATES THAT IT IS OKAY TO LEAVE VOICEMAIL.     PATIENT CALL BACK: [phone]

## 2025-04-03 ENCOUNTER — OFFICE VISIT (OUTPATIENT)
Dept: OBSTETRICS AND GYNECOLOGY | Facility: CLINIC | Age: 40
End: 2025-04-03
Payer: COMMERCIAL

## 2025-04-03 VITALS
BODY MASS INDEX: 21.16 KG/M2 | WEIGHT: 134.8 LBS | DIASTOLIC BLOOD PRESSURE: 72 MMHG | SYSTOLIC BLOOD PRESSURE: 114 MMHG | HEIGHT: 67 IN

## 2025-04-03 DIAGNOSIS — Z23 NEED FOR HPV VACCINATION: Primary | ICD-10-CM

## 2025-04-03 DIAGNOSIS — Z87.42 HISTORY OF OVARIAN CYST: ICD-10-CM

## 2025-04-03 DIAGNOSIS — R10.2 PELVIC CRAMPING: ICD-10-CM

## 2025-04-03 DIAGNOSIS — Z01.419 PAP TEST, AS PART OF ROUTINE GYNECOLOGICAL EXAMINATION: ICD-10-CM

## 2025-04-03 NOTE — PROGRESS NOTES
Chief Complaint   Patient presents with    repeat pap smear       Subjective   HPI  Olena Larios is a 39 y.o. female, . Her last LMP was Patient's last menstrual period was 2025 (approximate).. who presents for a repeat pap smear.    Patient was last seen for her annual exam 2025.  Patient reports having a pap smear at that time, but when calling and speaking with [facility], they did not have record.  She wishes to have it repeated today.     She would also like to discuss some left sided pelvic cramping and history of ovarian cyst. She reports she had a GI bug 3 weeks ago but has had persistent pain that feels similar to ovarian cyst she has had in the past.     Additional OB/GYN History     Last Pap :   Last Completed Pap Smear            Awaiting Completion       PAP SMEAR (Every 3 Years) Order placed this encounter      2025  Order placed for LIQUID-BASED PAP SMEAR WITH HPV GENOTYPING IF ASCUS ([facility]) by Lovely Burrell MA    2023  LIQUID-BASED PAP SMEAR WITH HPV GENOTYPING IF ASCUS ([facility])    2022  LIQUID-BASED PAP SMEAR, [facility] ([facility])    2021  SCANNED - PAP SMEAR    2020  Done - hpv regardless; negative     Only the first 5 history entries have been loaded, but more history exists.                          Tobacco Usage?: No   OB History          1    Para   1    Term   1       0    AB   0    Living   1         SAB   0    IAB   0    Ectopic   0    Molar   0    Multiple   0    Live Births   1                  Current Outpatient Medications:     multivitamin with minerals tablet tablet, Take 1 tablet by mouth Daily., Disp: , Rfl:     norgestimate-ethinyl estradiol (Tri-Sprintec) 0.18/0.215/0.25 MG-35 MCG per tablet, Take 1 tablet by mouth Daily., Disp: 84 tablet, Rfl: 3    saccharomyces boulardii (FLORASTOR) 250 MG capsule, Take 1 capsule by mouth 2 (Two) Times a Day. (Patient not taking: Reported on 3/19/2025),  "Disp: , Rfl:     vitamin C (ASCORBIC ACID) 250 MG tablet, Take 1 tablet by mouth Daily., Disp: , Rfl:      Past Medical History:   Diagnosis Date    Abnormal Pap smear of cervix 5/2018    Mild dysplasia    Anemia 2006    Borderline    Cervical dysplasia 5/2018    Mild    Fibroadenoma     Fibrocystic breast     Herpes genitalia     History of abnormal cervical Pap smear 05/2018    HPV +    HPV (human papilloma virus) infection 5/2018    Colposcopy    Ovarian cyst 2012?    Laparoscopy    Varicella 1990        Past Surgical History:   Procedure Laterality Date    BREAST BIOPSY  2006    Multiple--all benign    BREAST FIBROADENOMA SURGERY Left     COLPOSCOPY  2018    OVARIAN CYST REMOVAL      Patient unable to recall    OVARIAN CYST SURGERY  2012    Laparoscopy    SKIN BIOPSY      Left leg, sternum, back       The additional following portions of the patient's history were reviewed and updated as appropriate: allergies, current medications, past family history, past medical history, past social history, past surgical history, and problem list.    Review of Systems   Constitutional: Negative.    Genitourinary:  Positive for pelvic pain.       I have reviewed and agree with the HPI, ROS, and historical information as entered above. Jenny Alves, APRN      Objective   /72   Ht 170.2 cm (67\")   Wt 61.1 kg (134 lb 12.8 oz)   LMP 03/26/2025 (Approximate)   BMI 21.11 kg/m²     Physical Exam  Vitals and nursing note reviewed. Exam conducted with a chaperone present.   Constitutional:       Appearance: Normal appearance.   Pulmonary:      Effort: Pulmonary effort is normal.   Abdominal:      Palpations: Abdomen is soft.      Tenderness: There is no abdominal tenderness.   Genitourinary:     Labia:         Right: No rash, tenderness or lesion.         Left: No rash, tenderness or lesion.       Vagina: Normal. No lesions.      Cervix: No cervical motion tenderness, discharge, lesion or cervical bleeding.      Uterus: " Normal. Not enlarged, not fixed and not tender.       Adnexa:         Right: No mass or tenderness.          Left: No mass or tenderness.        Rectum: No external hemorrhoid.      Comments: Chaperone Present  Neurological:      Mental Status: She is alert.         Assessment & Plan     Assessment     Problem List Items Addressed This Visit    None  Visit Diagnoses         Pap test, as part of routine gynecological examination    -  Primary    Relevant Orders    LIQUID-BASED PAP SMEAR WITH HPV GENOTYPING IF ASCUS ([facility])      Pelvic cramping        Relevant Orders    US Non-ob Transvaginal      History of ovarian cyst        Relevant Orders    US Non-ob Transvaginal            Pap repeated today due to lost specimen. She would like to resume gardasil series. She received the first in the series in 2021 prior to pregnancy. Will give # 2 today  RTO for U/S    Plan     Return in about 3 weeks (around 4/24/2025) for U/S .        Jenny Alves, MICHAEL  04/03/2025

## 2025-04-04 LAB — REF LAB TEST METHOD: NORMAL

## 2025-04-08 RX ORDER — SODIUM, POTASSIUM,MAG SULFATES 17.5-3.13G
SOLUTION, RECONSTITUTED, ORAL ORAL
Qty: 354 ML | Refills: 0 | Status: SHIPPED | OUTPATIENT
Start: 2025-04-08

## 2025-04-11 ENCOUNTER — HOSPITAL ENCOUNTER (OUTPATIENT)
Dept: MAMMOGRAPHY | Facility: HOSPITAL | Age: 40
Discharge: HOME | End: 2025-04-11
Payer: COMMERCIAL

## 2025-04-11 ENCOUNTER — HOSPITAL ENCOUNTER (OUTPATIENT)
Dept: ULTRASOUND IMAGING | Facility: HOSPITAL | Age: 40
Discharge: HOME | End: 2025-04-11
Payer: COMMERCIAL

## 2025-04-11 DIAGNOSIS — R92.8 ABNORMAL MAMMOGRAM: ICD-10-CM

## 2025-04-11 PROCEDURE — 76642 ULTRASOUND BREAST LIMITED: CPT

## 2025-04-11 PROCEDURE — G0279 TOMOSYNTHESIS, MAMMO: HCPCS

## 2025-04-11 PROCEDURE — 77066 DX MAMMO INCL CAD BI: CPT

## 2025-04-14 ENCOUNTER — OUTSIDE FACILITY SERVICE (OUTPATIENT)
Dept: GASTROENTEROLOGY | Facility: CLINIC | Age: 40
End: 2025-04-14
Payer: COMMERCIAL

## 2025-04-14 PROCEDURE — 88305 TISSUE EXAM BY PATHOLOGIST: CPT | Performed by: INTERNAL MEDICINE

## 2025-04-15 ENCOUNTER — TELEPHONE (OUTPATIENT)
Dept: MRI IMAGING | Facility: HOSPITAL | Age: 40
End: 2025-04-15
Payer: COMMERCIAL

## 2025-04-15 ENCOUNTER — LAB REQUISITION (OUTPATIENT)
Dept: LAB | Facility: HOSPITAL | Age: 40
End: 2025-04-15
Payer: COMMERCIAL

## 2025-04-15 DIAGNOSIS — R14.0 ABDOMINAL DISTENSION (GASEOUS): ICD-10-CM

## 2025-04-15 DIAGNOSIS — R19.7 DIARRHEA, UNSPECIFIED: ICD-10-CM

## 2025-04-15 DIAGNOSIS — D12.0 BENIGN NEOPLASM OF CECUM: ICD-10-CM

## 2025-04-15 DIAGNOSIS — R19.4 CHANGE IN BOWEL HABIT: ICD-10-CM

## 2025-04-15 NOTE — TELEPHONE ENCOUNTER
Pt called to schedule her 6 month f/u Breast MRI. She is seeing someone in [facility]'s office on 4/17 and will ask for an order to be sent over. She will then call on her Day 1 which should be next week.  If we don't see an order, I told pt we could send one over for a cosign.    [place] 4.15.25

## 2025-04-16 LAB
CYTO UR: NORMAL
LAB AP CASE REPORT: NORMAL
LAB AP CLINICAL INFORMATION: NORMAL
PATH REPORT.FINAL DX SPEC: NORMAL
PATH REPORT.GROSS SPEC: NORMAL

## 2025-04-17 ENCOUNTER — OFFICE VISIT (OUTPATIENT)
Dept: OBSTETRICS AND GYNECOLOGY | Facility: CLINIC | Age: 40
End: 2025-04-17
Payer: COMMERCIAL

## 2025-04-17 VITALS
WEIGHT: 134 LBS | DIASTOLIC BLOOD PRESSURE: 80 MMHG | BODY MASS INDEX: 21.03 KG/M2 | HEIGHT: 67 IN | SYSTOLIC BLOOD PRESSURE: 112 MMHG

## 2025-04-17 DIAGNOSIS — Z80.3 FAMILY HISTORY OF BREAST CANCER IN FIRST DEGREE RELATIVE: Primary | ICD-10-CM

## 2025-04-17 DIAGNOSIS — D25.9 UTERINE LEIOMYOMA, UNSPECIFIED LOCATION: ICD-10-CM

## 2025-04-17 DIAGNOSIS — R92.8 ABNORMAL MRI, BREAST: ICD-10-CM

## 2025-04-17 PROCEDURE — 99213 OFFICE O/P EST LOW 20 MIN: CPT | Performed by: NURSE PRACTITIONER

## 2025-04-17 NOTE — PROGRESS NOTES
Chief Complaint   Patient presents with    Fibroids       Subjective   HPI  Olena Larios is a 39 y.o. female, . Her last LMP was Patient's last menstrual period was 2025 (approximate).. who presents for follow up on fibroids cramping and bloating     At her last visit she was treated with  none . Since then she reports her symptoms/issue has improved. The patient reports additional symptoms as none.        Additional OB/GYN History     Last Pap :   Last Completed Pap Smear            Upcoming       PAP SMEAR (Every 3 Years) Next due on 4/3/2028      2025  LIQUID-BASED PAP SMEAR WITH HPV GENOTYPING IF ASCUS ([facility])    2023  LIQUID-BASED PAP SMEAR WITH HPV GENOTYPING IF ASCUS ([facility])    2022  LIQUID-BASED PAP SMEAR, [facility] ([facility])    2021  SCANNED - PAP SMEAR    2020  Done - hpv regardless; negative     Only the first 5 history entries have been loaded, but more history exists.                          Last mammogram:   Last Completed Mammogram            Completed or No Longer Recommended       MAMMOGRAM  Discontinued        Frequency changed to Never automatically (Topic No Longer Applies)    2025  Mammo Diagnostic Digital Tomosynthesis Bilateral With CAD    2024  Mammo Screening Digital Tomosynthesis Bilateral With CAD    2013  MAMMO DIAGNOSTIC BILATERAL W CAD                            Tobacco Usage?: No   OB History          1    Para   1    Term   1       0    AB   0    Living   1         SAB   0    IAB   0    Ectopic   0    Molar   0    Multiple   0    Live Births   1                  Current Outpatient Medications:     multivitamin with minerals tablet tablet, Take 1 tablet by mouth Daily., Disp: , Rfl:     norgestimate-ethinyl estradiol (Tri-Sprintec) 0.18/0.215/0.25 MG-35 MCG per tablet, Take 1 tablet by mouth Daily., Disp: 84 tablet, Rfl: 3    saccharomyces boulardii (FLORASTOR) 250 MG capsule,  "Take 1 capsule by mouth 2 (Two) Times a Day. (Patient not taking: Reported on 3/19/2025), Disp: , Rfl:     sodium-potassium-magnesium sulfates (Suprep Bowel Prep Kit) 17.5-3.13-1.6 GM/177ML solution oral solution, Use as directed by provider for colonoscopy prep., Disp: 354 mL, Rfl: 0    vitamin C (ASCORBIC ACID) 250 MG tablet, Take 1 tablet by mouth Daily., Disp: , Rfl:      Past Medical History:   Diagnosis Date    Abnormal Pap smear of cervix 5/2018    Mild dysplasia    Anemia 2006    Borderline    Cervical dysplasia 5/2018    Mild    Fibroadenoma     Fibrocystic breast     Herpes genitalia     History of abnormal cervical Pap smear 05/2018    HPV +    HPV (human papilloma virus) infection 5/2018    Colposcopy    Ovarian cyst 2012?    Laparoscopy    Varicella 1990        Past Surgical History:   Procedure Laterality Date    BREAST BIOPSY  2006    Multiple--all benign    BREAST FIBROADENOMA SURGERY Left     COLPOSCOPY  2018    OVARIAN CYST REMOVAL      Patient unable to recall    OVARIAN CYST SURGERY  2012    Laparoscopy    SKIN BIOPSY      Left leg, sternum, back       The additional following portions of the patient's history were reviewed and updated as appropriate: allergies, current medications, past family history, past medical history, past social history, past surgical history, and problem list.    Review of Systems   Constitutional: Negative.    Genitourinary:  Negative for pelvic pain.       I have reviewed and agree with the HPI, ROS, and historical information as entered above. Jenny Alves, APRN      Objective   /80   Ht 170.2 cm (67\")   Wt 60.8 kg (134 lb)   LMP 03/26/2025 (Approximate)   BMI 20.99 kg/m²     Physical Exam  Constitutional:       Appearance: Normal appearance.   Pulmonary:      Effort: Pulmonary effort is normal.   Neurological:      Mental Status: She is alert.         Assessment & Plan     Assessment     Problem List Items Addressed This Visit          Family History "    Family history of breast cancer in first degree relative - Primary    Relevant Orders    MRI Breast Bilateral Screening With & Without Contrast       Genitourinary and Reproductive     Uterine fibroid          Overview    6 x 5 x 8 mm in size. Patient desires 6 month F/U for stability        Relevant Orders    US Non-ob Transvaginal     Other Visit Diagnoses         Abnormal MRI, breast        Relevant Orders    MRI Breast Bilateral Screening With & Without Contrast            U/S today is virtually normal with a sub-centimeter uterine fibroid and 2 cm simple cyst. Previous symptoms have improved.     Plan     6 month follow up breast MRI ordered  Return in about 6 months (around 10/17/2025) for U/S .        Jenny Alves, APRN  04/17/2025

## 2025-05-02 ENCOUNTER — HOSPITAL ENCOUNTER (OUTPATIENT)
Dept: MRI IMAGING | Facility: HOSPITAL | Age: 40
Discharge: HOME | End: 2025-05-02
Admitting: NURSE PRACTITIONER
Payer: COMMERCIAL

## 2025-05-02 DIAGNOSIS — R92.8 ABNORMAL MRI, BREAST: ICD-10-CM

## 2025-05-02 DIAGNOSIS — Z80.3 FAMILY HISTORY OF BREAST CANCER IN FIRST DEGREE RELATIVE: ICD-10-CM

## 2025-05-02 PROCEDURE — 77049 MRI BREAST C-+ W/CAD BI: CPT

## 2025-05-02 PROCEDURE — A9577 INJ MULTIHANCE: HCPCS | Performed by: NURSE PRACTITIONER

## 2025-05-02 PROCEDURE — 25510000002 GADOBENATE DIMEGLUMINE 529 MG/ML SOLUTION: Performed by: NURSE PRACTITIONER

## 2025-05-02 RX ADMIN — GADOBENATE DIMEGLUMINE 12 ML: 529 INJECTION, SOLUTION INTRAVENOUS at 10:09

## 2025-05-23 ENCOUNTER — OFFICE VISIT (OUTPATIENT)
Dept: INTERNAL MEDICINE | Facility: CLINIC | Age: 40
End: 2025-05-23
Payer: COMMERCIAL

## 2025-05-23 ENCOUNTER — LAB (OUTPATIENT)
Dept: LAB | Facility: HOSPITAL | Age: 40
End: 2025-05-23
Payer: COMMERCIAL

## 2025-05-23 VITALS
BODY MASS INDEX: 21.03 KG/M2 | DIASTOLIC BLOOD PRESSURE: 72 MMHG | HEIGHT: 67 IN | OXYGEN SATURATION: 98 % | SYSTOLIC BLOOD PRESSURE: 108 MMHG | HEART RATE: 75 BPM | WEIGHT: 134 LBS

## 2025-05-23 DIAGNOSIS — Z13.29 SCREENING FOR HYPOTHYROIDISM: ICD-10-CM

## 2025-05-23 DIAGNOSIS — Z13.21 ENCOUNTER FOR VITAMIN DEFICIENCY SCREENING: ICD-10-CM

## 2025-05-23 DIAGNOSIS — Z00.00 ANNUAL PHYSICAL EXAM: Primary | ICD-10-CM

## 2025-05-23 DIAGNOSIS — Z13.220 SCREENING FOR HYPERLIPIDEMIA: ICD-10-CM

## 2025-05-23 LAB
25(OH)D3 SERPL-MCNC: 50.9 NG/ML (ref 30–100)
ALBUMIN SERPL-MCNC: 4.4 G/DL (ref 3.5–5.2)
ALBUMIN/GLOB SERPL: 1.4 G/DL
ALP SERPL-CCNC: 75 U/L (ref 39–117)
ALT SERPL W P-5'-P-CCNC: 18 U/L (ref 1–33)
ANION GAP SERPL CALCULATED.3IONS-SCNC: 10 MMOL/L (ref 5–15)
AST SERPL-CCNC: 23 U/L (ref 1–32)
BILIRUB SERPL-MCNC: 1.2 MG/DL (ref 0–1.2)
BILIRUB UR QL STRIP: NEGATIVE
BUN SERPL-MCNC: 10 MG/DL (ref 6–20)
BUN/CREAT SERPL: 11.8 (ref 7–25)
CALCIUM SPEC-SCNC: 9.2 MG/DL (ref 8.6–10.5)
CHLORIDE SERPL-SCNC: 103 MMOL/L (ref 98–107)
CHOLEST SERPL-MCNC: 230 MG/DL (ref 0–200)
CLARITY UR: CLEAR
CO2 SERPL-SCNC: 25 MMOL/L (ref 22–29)
COLOR UR: YELLOW
CREAT SERPL-MCNC: 0.85 MG/DL (ref 0.57–1)
DEPRECATED RDW RBC AUTO: 45.4 FL (ref 37–54)
EGFRCR SERPLBLD CKD-EPI 2021: 88.4 ML/MIN/1.73
ERYTHROCYTE [DISTWIDTH] IN BLOOD BY AUTOMATED COUNT: 12.7 % (ref 12.3–15.4)
GLOBULIN UR ELPH-MCNC: 3.1 GM/DL
GLUCOSE SERPL-MCNC: 93 MG/DL (ref 65–99)
GLUCOSE UR STRIP-MCNC: NEGATIVE MG/DL
HCT VFR BLD AUTO: 42.9 % (ref 34–46.6)
HDLC SERPL-MCNC: 64 MG/DL (ref 40–60)
HGB BLD-MCNC: 15.3 G/DL (ref 12–15.9)
HGB UR QL STRIP.AUTO: NEGATIVE
HOLD SPECIMEN: NORMAL
KETONES UR QL STRIP: NEGATIVE
LDLC SERPL CALC-MCNC: 147 MG/DL (ref 0–100)
LDLC/HDLC SERPL: 2.25 {RATIO}
LEUKOCYTE ESTERASE UR QL STRIP.AUTO: NEGATIVE
MCH RBC QN AUTO: 34.3 PG (ref 26.6–33)
MCHC RBC AUTO-ENTMCNC: 35.7 G/DL (ref 31.5–35.7)
MCV RBC AUTO: 96.2 FL (ref 79–97)
NITRITE UR QL STRIP: NEGATIVE
PH UR STRIP.AUTO: 6 [PH] (ref 5–8)
PLATELET # BLD AUTO: 293 10*3/MM3 (ref 140–450)
PMV BLD AUTO: 10.3 FL (ref 6–12)
POTASSIUM SERPL-SCNC: 4.4 MMOL/L (ref 3.5–5.2)
PROT SERPL-MCNC: 7.5 G/DL (ref 6–8.5)
PROT UR QL STRIP: NEGATIVE
RBC # BLD AUTO: 4.46 10*6/MM3 (ref 3.77–5.28)
SODIUM SERPL-SCNC: 138 MMOL/L (ref 136–145)
SP GR UR STRIP: 1.02 (ref 1–1.03)
TRIGL SERPL-MCNC: 110 MG/DL (ref 0–150)
TSH SERPL DL<=0.05 MIU/L-ACNC: 2.58 UIU/ML (ref 0.27–4.2)
UROBILINOGEN UR QL STRIP: NORMAL
VLDLC SERPL-MCNC: 19 MG/DL (ref 5–40)
WBC NRBC COR # BLD AUTO: 6.43 10*3/MM3 (ref 3.4–10.8)

## 2025-05-23 PROCEDURE — 36415 COLL VENOUS BLD VENIPUNCTURE: CPT | Performed by: NURSE PRACTITIONER

## 2025-05-23 PROCEDURE — 81003 URINALYSIS AUTO W/O SCOPE: CPT | Performed by: NURSE PRACTITIONER

## 2025-05-23 PROCEDURE — 82306 VITAMIN D 25 HYDROXY: CPT | Performed by: NURSE PRACTITIONER

## 2025-05-23 PROCEDURE — 80061 LIPID PANEL: CPT | Performed by: NURSE PRACTITIONER

## 2025-05-23 PROCEDURE — 80050 GENERAL HEALTH PANEL: CPT | Performed by: NURSE PRACTITIONER

## 2025-05-23 NOTE — PROGRESS NOTES
Annual Physical     Name: Olena Larios    : 1985     MRN: [id number]     Chief Complaint  Annual Exam (labs)    Subjective     History of Present Illness:  Olena Larios is a 39 y.o. female who presents today for annual physical exam.      The patient is being seen for a health maintenance evaluation.    Past Medical History:   Diagnosis Date    Abnormal Pap smear of cervix 2018    Mild dysplasia    Anemia 2006    Borderline    Cervical dysplasia 2018    Mild    Colon polyp 23    Benign    Fibroadenoma     Fibrocystic breast     Herpes genitalia     History of abnormal cervical Pap smear 2018    HPV +    HPV (human papilloma virus) infection 2018    Colposcopy    Ovarian cyst ?    Laparoscopy    Varicella        Past Surgical History:   Procedure Laterality Date    BREAST BIOPSY  2006    Multiple--all benign    BREAST FIBROADENOMA SURGERY Left     COLONOSCOPY  23    COLPOSCOPY  2018    OVARIAN CYST REMOVAL      Patient unable to recall    OVARIAN CYST SURGERY      Laparoscopy    SKIN BIOPSY      Left leg, sternum, back       Social History     Socioeconomic History    Marital status:      Spouse name: Niall   Tobacco Use    Smoking status: Never    Smokeless tobacco: Never   Vaping Use    Vaping status: Never Used   Substance and Sexual Activity    Alcohol use: Never     Comment: 1-2 drinks social occasions/every few months    Drug use: Never    Sexual activity: Yes     Partners: Male     Birth control/protection: Birth control pill         Current Outpatient Medications:     multivitamin with minerals tablet tablet, Take 1 tablet by mouth Daily., Disp: , Rfl:     norgestimate-ethinyl estradiol (Tri-Sprintec) 0.18/0.215/0.25 MG-35 MCG per tablet, Take 1 tablet by mouth Daily., Disp: 84 tablet, Rfl: 3    saccharomyces boulardii (FLORASTOR) 250 MG capsule, Take 1 capsule by mouth 2 (Two) Times a Day., Disp: , Rfl:     vitamin C (ASCORBIC ACID) 250 MG  tablet, Take 1 tablet by mouth Daily., Disp: , Rfl:     General History  Olena  does have regular dental visits.  She does complain of vision problems. Last eye exam was October 2024. Wears contacts.  Immunizations are up to date. The patient needs the following immunizations: None at this time.    Lifestyle  Olena  consumes  a decent diet .  She exercises rarely. Tries to walk when she can.    Reproductive Health  Olena  is perimenopausal.  She reports periods are regular every 28-30 days.  She is sexually active. Her contraceptive plan is oral contraceptives (estrogen/progesterone).    Screening  Last pap was 04/2025.  Last Completed Pap Smear            Upcoming       PAP SMEAR (Every 3 Years) Next due on 4/3/2028      04/03/2025  LIQUID-BASED PAP SMEAR WITH HPV GENOTYPING IF ASCUS ([facility])    12/06/2023  LIQUID-BASED PAP SMEAR WITH HPV GENOTYPING IF ASCUS ([facility])    11/30/2022  LIQUID-BASED PAP SMEAR, [facility] ([facility])    07/01/2021  SCANNED - PAP SMEAR    06/11/2020  Done - hpv regardless; negative     Only the first 5 history entries have been loaded, but more history exists.                      . History of abnormal pap smear or family history of gyn cancer: Yes, abnormal pap in the past. No known FH.  Last mammogram was 04/2025. Breast MRI 05/2025.  Last Completed Mammogram            Completed or No Longer Recommended       MAMMOGRAM  Discontinued        Frequency changed to Never automatically (Topic No Longer Applies)    04/11/2025  Mammo Diagnostic Digital Tomosynthesis Bilateral With CAD    03/26/2024  Mammo Screening Digital Tomosynthesis Bilateral With CAD    03/11/2013  MAMMO DIAGNOSTIC BILATERAL W CAD                        . Personal or family history of abnormal mammograms or breast cancer: Yes, mother with breast cancer.  Last colonoscopy was 04/2025.  Last Completed Colonoscopy    This patient has no relevant Health Maintenance data.     . Family history of colon cancer: No known  FH.  Last DEXA was never.    Health Maintenance Summary            Upcoming       INFLUENZA VACCINE (Yearly - July to March) Next due on 7/1/2025      10/23/2024  Outside Immunization: Influenza Recombinant    10/22/2024  Patient Reported Immunization: Influenza, Unspecified    10/04/2022  Patient Reported Immunization: Influenza, Unspecified    11/01/2011  Outside Immunization: Influenza (whole)              MAMMOGRAM TCS >= 20 (Yearly) Next due on 4/11/2026 04/11/2025  Mammo Diagnostic Digital Tomosynthesis Bilateral With CAD    03/26/2024  Mammo Screening Digital Tomosynthesis Bilateral With CAD    03/11/2013  MAMMO DIAGNOSTIC BILATERAL W CAD              Annual Breast MRI (Yearly) Next due on 5/2/2026 05/02/2025  MRI Breast Bilateral Diagnostic W WO Contrast    09/23/2024  MRI Breast Bilateral Screening With & Without Contrast              ANNUAL PHYSICAL (Yearly) Next due on 5/23/2026 05/23/2025  Done              PAP SMEAR (Every 3 Years) Next due on 4/3/2028      04/03/2025  LIQUID-BASED PAP SMEAR WITH HPV GENOTYPING IF ASCUS ([facility])    12/06/2023  LIQUID-BASED PAP SMEAR WITH HPV GENOTYPING IF ASCUS ([facility])    11/30/2022  LIQUID-BASED PAP SMEAR, [facility] ([facility])    07/01/2021  SCANNED - PAP SMEAR    06/11/2020  Done - hpv regardless; negative      Only the first 5 history entries have been loaded, but more history exists.              TDAP/TD VACCINES (3 - Td or Tdap) Next due on 8/3/2032      08/03/2022  Imm Admin: Tdap    11/01/2011  Outside Immunization: Tdap                      Completed or No Longer Recommended       HEPATITIS C SCREENING  Completed      03/09/2022  Hep C Virus Ab component of Obstetric Panel              COVID-19 Vaccine (Series Information) Completed      10/23/2024  Imm Admin: COVID-19 (PFIZER) 12YRS+ ([facility])    10/22/2024  Patient Reported Immunization: COVID-19 (UNSPECIFIED)              Pneumococcal Vaccine 0-49 (Series Information) Aged Out  "     No completion, postpone, or frequency change history exists for this topic.              MAMMOGRAM  Discontinued        Frequency changed to Never automatically (Topic No Longer Applies)    04/11/2025  Mammo Diagnostic Digital Tomosynthesis Bilateral With CAD    03/26/2024  Mammo Screening Digital Tomosynthesis Bilateral With CAD    03/11/2013  MAMMO DIAGNOSTIC BILATERAL W CAD                          Immunization History   Administered Date(s) Administered    COVID-19 (PFIZER) 12YRS+ (COMIRNATY) 10/23/2024    Hpv9 07/01/2021, 04/03/2025    Tdap 08/03/2022       Review of Systems   Constitutional: Negative.    HENT: Negative.     Respiratory: Negative.     Cardiovascular: Negative.    Gastrointestinal: Negative.    Genitourinary: Negative.    Musculoskeletal: Negative.    Neurological: Negative.    Psychiatric/Behavioral: Negative.         Objective     Vital Signs  /72   Pulse 75   Ht 170.2 cm (67.01\")   Wt 60.8 kg (134 lb)   SpO2 98%   BMI 20.98 kg/m²   Estimated body mass index is 20.98 kg/m² as calculated from the following:    Height as of this encounter: 170.2 cm (67.01\").    Weight as of this encounter: 60.8 kg (134 lb).    BMI is within normal parameters. No other follow-up for BMI required.       Physical Exam  Constitutional:       General: She is awake. She is not in acute distress.     Appearance: Normal appearance. She is well-developed, well-groomed and normal weight. She is not ill-appearing.   HENT:      Head: Normocephalic and atraumatic.      Right Ear: Tympanic membrane, ear canal and external ear normal.      Left Ear: Tympanic membrane, ear canal and external ear normal.      Nose: Nose normal.      Mouth/Throat:      Mouth: Mucous membranes are moist.      Pharynx: Oropharynx is clear.   Eyes:      General: No scleral icterus.     Extraocular Movements: Extraocular movements intact.      Conjunctiva/sclera: Conjunctivae normal.      Pupils: Pupils are equal, round, and reactive " to light.   Neck:      Trachea: Trachea normal.   Cardiovascular:      Rate and Rhythm: Normal rate and regular rhythm.      Pulses: Normal pulses.      Heart sounds: Normal heart sounds. No murmur heard.  Pulmonary:      Effort: Pulmonary effort is normal. No tachypnea, accessory muscle usage or respiratory distress.      Breath sounds: Normal breath sounds. No wheezing, rhonchi or rales.   Abdominal:      General: Abdomen is flat. Bowel sounds are normal. There is no distension.      Palpations: Abdomen is soft.      Tenderness: There is no abdominal tenderness.   Genitourinary:     Comments: Deferred  Musculoskeletal:         General: No swelling. Normal range of motion.      Cervical back: Normal range of motion and neck supple. No tenderness.      Right lower leg: No edema.      Left lower leg: No edema.   Skin:     General: Skin is warm and dry.      Capillary Refill: Capillary refill takes less than 2 seconds.      Coloration: Skin is not jaundiced or pale.      Findings: No lesion or rash.   Neurological:      General: No focal deficit present.      Mental Status: She is alert and oriented to person, place, and time. Mental status is at baseline.      Motor: No weakness.      Gait: Gait is intact.   Psychiatric:         Attention and Perception: Attention normal.         Mood and Affect: Mood normal.         Speech: Speech normal.         Behavior: Behavior normal. Behavior is cooperative.         Thought Content: Thought content normal.         Judgment: Judgment normal.        Assessment and Plan     Diagnoses and all orders for this visit:    1. Annual physical exam (Primary)  -     CBC (No Diff)  -     Comprehensive metabolic panel  -     TSH Rfx On Abnormal To Free T4  -     Lipid Panel  -     Vitamin D,25-Hydroxy  -     Urinalysis With Culture If Indicated - Urine, Clean Catch    2. Screening for hyperlipidemia  -     Lipid Panel    3. Screening for hypothyroidism  -     TSH Rfx On Abnormal To Free  T4    4. Encounter for vitamin deficiency screening  -     Vitamin D,25-Hydroxy        Plan:  Annual physical exam.  Orders placed for fasting labs.  Will review lab results with patient once received and reviewed.  Further plan of care based on lab result findings.  Up-to-date on Pap smear.  Up-to-date on mammogram.  Up-to-date on colonoscopy.  Up-to-date on vaccinations.  Continue with up to date immunizations and health maintenance screenings.  Continue with healthy lifestyle choices such as healthy diet and eating habits and regular exercise routine.  Continue with adequate oral hydration and rest.  Patient will inquire about genetic testing that was supposed to be ordered by colorectal.  Patient will notify me if she needs new orders placed.  Annual physical exam in 1 year.  Return to clinic sooner if needed.    Follow Up  Return in about 1 year (around 5/23/2026), or if symptoms worsen or fail to improve, for Annual.      MICHAEL Patterson    Part of this note may be an electronic transcription/translation of spoken language to printed text using the Dragon Dictation System.

## 2025-07-23 ENCOUNTER — TELEPHONE (OUTPATIENT)
Dept: GENETICS | Facility: HOSPITAL | Age: 40
End: 2025-07-23
Payer: COMMERCIAL

## 2025-07-25 ENCOUNTER — LAB (OUTPATIENT)
Dept: LAB | Facility: HOSPITAL | Age: 40
End: 2025-07-25
Payer: COMMERCIAL

## 2025-07-25 ENCOUNTER — CLINICAL SUPPORT (OUTPATIENT)
Dept: GENETICS | Facility: HOSPITAL | Age: 40
End: 2025-07-25
Payer: COMMERCIAL

## 2025-07-25 DIAGNOSIS — Z80.3 FAMILY HISTORY OF MALIGNANT NEOPLASM OF BREAST: ICD-10-CM

## 2025-07-25 DIAGNOSIS — Z13.79 GENETIC TESTING: Primary | ICD-10-CM

## 2025-07-25 DIAGNOSIS — Z80.42 FAMILY HISTORY OF MALIGNANT NEOPLASM OF PROSTATE: ICD-10-CM

## 2025-07-25 DIAGNOSIS — Z80.0 FAMILY HISTORY OF PANCREATIC CANCER: ICD-10-CM

## 2025-07-25 PROCEDURE — 36415 COLL VENOUS BLD VENIPUNCTURE: CPT

## 2025-07-25 PROCEDURE — 96041 GENETIC COUNSELING SVC EA 30: CPT

## 2025-07-25 NOTE — PROGRESS NOTES
Encounter Date: 2025   Patient Name: Olena Larios  : 1985   MRN: [id number]     Referring Provider: Emma Cabrera APRN    REASON FOR VISIT  Olena Larios is a 40 y.o. female referred for genetic counseling due to a family history of cancer.  Ms. Larios was interested in discussing her risk for a hereditary cancer syndrome and genetic testing for cancer susceptibility.  Ms. Larios does not have a personal history of cancer.  She reached menarche at age 12 and had her son at age 37.  She is premenopausal and has never used HRT.  Her breasts are extremely dense and in  she had a biopsied proven hyalinized fibroadenoma.  Ms. Larios elected to pursue genetic testing via [facility]Next-Expanded panel with [facility] analyzing 77-cancer-risk genes.    PERTINENT FAMILY HISTORY:  A cancer-focused four-generation pedigree was obtained via patient reporting    Mother - breast cancer, 56    - skin cancer, 50's  Maternal Grandfather - leukemia, 70's  Maternal Grandmother - skin cancer, 90's  Maternal 1st Cousin - melanoma, 37  Father - skin cancer, 60's    - bladder cancer, 76  Paternal Uncle - prostate cancer, 60's  Paternal Half-Uncle - prostate cancer, 60's  Paternal Grandfather - pancreatic cancer, 70's    RISK ASSESSMENT  Ms. Larios's reported family history is suggestive of a hereditary cancer risk.  Ms. Larios meets NCCN guidelines criteria for genetic testing based on having a second-degree paternal relative diagnosed with prostate cancer whose father was diagnosed with pancreatic cancer.  A significant proportion (>50%) of hereditary breast and ovarian cancer can be attributed to mutations in the BRCA1 and BRCA2 genes.  Males with mutations in BRCA1 can have a lifetime prostate cancer risk up to 26%, while BRCA2 mutations can increase this risk up to 61%.  The general population lifetime risk for prostate cancer is 12% - 13%.  Mutations in these genes can  also increase the lifetime risk of male breast cancer up to 0.2% - 7.1%, while the general population lifetime male breast cancer is is ~0.1%.  Females with BRCA1/2 mutations are also at a significantly increased risk for breast cancer as well as ovarian cancer.  Mutations in these genes confer an increased risk for pancreatic cancer as well.  There are other clinically significant cancer related genes, as well as other, more rare, hereditary cancer syndromes than can increase risk for prostate cancer. The degree of risk and screening recommendations vary by gene, the individual's age, and related family history.    GENETIC COUNSELING  We reviewed the family history information in detail. Cases of cancer follow three general patterns: sporadic, familial, and hereditary.  While most cancer cases are sporadic (~70% of cancer cases), some cases appear to occur in family clusters.  These cases are said to be familial and account for around 20% of cancer cases.  Familial cases may be due to a combination of shared genes and environmental factors among family members that we cannot evaluate via genetic testing at this time.  In 5% - 10% of cancer cases, the risk for cancer is inherited, and the genes responsible for the increased cancer risk are known.      Family histories typical of hereditary cancer syndromes usually include multiple first- and second-degree relatives diagnosed with cancer types that define a syndrome.  These cases tend to be diagnosed at younger-than-expected ages and can be bilateral or multifocal.  The cancer in these families follows an autosomal dominant inheritance pattern, which indicates the likely presence of a mutation in a cancer risk gene.  Children and siblings of an individual carrying a mutation have a 50% chance of inheriting that mutation, thereby inheriting the increased risk to develop cancer.  However, it is not recommended to pursue genetic testing for adult-onset cancers in  "children as the results would not have clinical utility until some time in adulthood among other ethical reasons.  These mutations can be passed down from the maternal or the paternal lineage.    We discussed that risk factors or \"red flags\" for hereditary risk include cancers diagnosed at earlier-than-average ages, multiple family members diagnosed with cancers associated with mutations in the same gene, or multiple generations of associated cancers. Dependent on the specific cancer type or syndrome, there exists the potential for several clinically significant genes related to the cancer's onset or increased risk for development.  Therefore, the standard approach to hereditary cancer genetic testing at this time is via multi-gene panel analyzing several genes associated with a hereditary risk for cancer.  Once results are completed, we will review them in the context of the patient's personal and family history to determine what, if any, post-test cancer risk management changes are recommended.  When affected relatives are unavailable or unwilling to pursue genetic testing, it is reasonable to offer genetic testing to an unaffected individual.      GENETIC TESTING  The risks, benefits, and limitations of genetic testing and implications for clinical management following testing were reviewed.  Genetic test results can influence decisions regarding screening and prevention.  Genetic testing can have significant psychological implications for both individuals and families. Also discussed was the possibility of insurance discrimination based on genetic test results and the laws in place to prevent this, as well as the limitations of these laws.      The Genetic Information Nondiscrimination Act (FREIDA) is a federal law enacted in May 2008.  FREIDA prohibits discrimination on the basis of genetic information such as genetic test results with respect to health insurance and employment status.  Under Title 1 of FREIDA, " "health insurance companies are prohibited from using an individual's genetic information to change premiums, drop or change an individual's coverage, or prevent coverage from being acquired.  Title 2 of FREIDA prohibits employers from using genetic information in employment decisions such as, but not limited to, hiring, firing, promotions, pay, and job assignments.  FREIDA protections do not protect against genetic discrimination by life insurance, long-term care or disability insurance,  service members, federal employees, those using the [facility], or those employed by a small business with fewer than 15 employees.    We discussed panel testing, which could involve testing numerous genes associated with increased cancer risk. The implications of a positive,negative, or VUS test result were discussed.  We also discussed the importance of testing an affected relative. In general, a negative genetic test result is most informative if a mutation has first been established in an affected member of the family.  In cases where an affected individual is not available or interested in testing, it is appropriate to offer testing to an unaffected individual.     With multigene panel testing, it is not uncommon for a variant of uncertain significance (VUS) to be identified.  Variants are termed \"VUS\" when there is not sufficient evidence to classify the variant as a negative (not harmful) variant or a positive (harmful) mutation. Genetic testing labs work to reclassify all VUSs overtime and will issue an updated report when a reclassification occurs.  The majority (over 90%) of VUSs that are reclassified are found to be negative genetic variants, however a small percentage will be upgraded to a harmful mutation. Clinically, a VUS is treated as a negative result.  If genetic testing is negative or a variant of uncertain significance (VUS) is found, management should be guided by a family history-based risk " assessment.  Medical care should not be altered based on a VUS result.  Genetic testing for other unaffected (never had cancer) relatives is not recommended for a VUS.    We discussed that there are limitations to testing an individual who has not had cancer.  A negative test result does not mean Ms. Robinss risk for cancer is low or even normal, although the risk would not be as high as it would with positive (harmful mutation) result.  It could still be increased over the general population based on family history alone.  If Ms. Larios tests negative it could be for several different reasons such as:    The possibility that there is a pathogenic variant causing cancer in the family, and that gene was on the patient's test, but Ms. Larios did not inherit it. We cannot know if this is the case by only testing the patient.  A familial mutation could therefore still be present in the family and other close family members are still at risk.  Ms. Larios could have a pathogenic variant in one of the genes tested for that was not detected. Current testing will identify the overwhelming majority of pathogenic variants, but some are not detectable with current technology.   Ms. Larios may carry a pathogenic variant in another gene associated with hereditary cancer predisposition that was not tested for, or the risk in the family may be due to other genetic factors that are not well understood.    ASSESSMENT AND PLAN  Ms. Larios meets NCCN guidelines for genetic testing.  We reviewed the options for genetic testing including a multi-gene panel of high risk genes, a panel of genes with known management guidelines, or a broader approach including more newly discovered genes.  We reviewed the benefits and drawbacks of this approach, including finding mutations in genes that are less well understood, or results that are unexpected based on this family history.  Ms. Larios elected to pursue  genetic testing.  [facility]Next - Expanded panel was ordered, which analyzes 77 genes associated with an increased cancer risk. The genes on this panel include AIP, ALK, APC, LILI, AXIN2, BAP1, BARD1, BMPR1A, BRCA1, BRCA2, BRIP1, CDC73, CDH1, CDK4, CDKN1B, CDKN2A, CEBPA, CHEK2, CTNNA1, DDX41, DICER1, EGFR, EPCAM, ETV6, FH, FLCN, GATA2, GREM1, HOXB13, KIT, LZTR1, MAX, MBD4, MEN1, MET, MITF, MLH1, MSH2, MSH3, MSH6, MUTYH, NF1, NF2, NTHL1, PALB2, PDGFRA, PHOX2B, PMS2, POLD1, POLE, POT1, EQKBI6C, PTCH1, PTEN, RAD51C, RAD51D, RB1, RET, RPS20, RUNX1, SDHA, SDHAF2, SDHB, SDHC, SDHD, SMAD4, SMARCA4, SMARCB1, SMARCE1, STK11, SUFU, NFUZ098, TP53, TSC1, TSC2, VHL, and WT1.  A blood sample for genetic evaluation was collected at [facility].  Genetic testing results are expected in 2 - 4 weeks from the day the sample is received by [facility] lab.  We will contact the patient when results are received.  Ms. Larios asked excellent questions during the consult and did not demonstrate any acute psychosocial distress during our visit. This clinic encounter was 30 minutes.      Dimitris Manley MS, [facility]  Genetic Counselor  [facility]

## 2025-08-04 ENCOUNTER — DOCUMENTATION (OUTPATIENT)
Dept: GENETICS | Facility: HOSPITAL | Age: 40
End: 2025-08-04
Payer: COMMERCIAL

## 2025-08-04 ENCOUNTER — TELEPHONE (OUTPATIENT)
Dept: GENETICS | Facility: HOSPITAL | Age: 40
End: 2025-08-04
Payer: COMMERCIAL